# Patient Record
Sex: MALE | Race: WHITE | NOT HISPANIC OR LATINO | Employment: FULL TIME | URBAN - METROPOLITAN AREA
[De-identification: names, ages, dates, MRNs, and addresses within clinical notes are randomized per-mention and may not be internally consistent; named-entity substitution may affect disease eponyms.]

---

## 2023-03-03 LAB — PROSTATE SPECIFIC AG (NG/ML) IN SER/PLAS: 2.77 NG/ML (ref 0–4)

## 2023-08-28 ENCOUNTER — OFFICE VISIT (OUTPATIENT)
Dept: PRIMARY CARE | Facility: CLINIC | Age: 63
End: 2023-08-28
Payer: COMMERCIAL

## 2023-08-28 VITALS
SYSTOLIC BLOOD PRESSURE: 147 MMHG | HEART RATE: 104 BPM | WEIGHT: 243 LBS | HEIGHT: 68 IN | OXYGEN SATURATION: 96 % | BODY MASS INDEX: 36.83 KG/M2 | DIASTOLIC BLOOD PRESSURE: 86 MMHG

## 2023-08-28 DIAGNOSIS — Z13.6 SCREENING FOR HEART DISEASE: ICD-10-CM

## 2023-08-28 DIAGNOSIS — Z00.00 HEALTH CARE MAINTENANCE: Primary | ICD-10-CM

## 2023-08-28 DIAGNOSIS — L30.9 ACUTE DERMATITIS: ICD-10-CM

## 2023-08-28 DIAGNOSIS — E78.00 HYPERCHOLESTEROLEMIA: ICD-10-CM

## 2023-08-28 DIAGNOSIS — Z23 NEED FOR TETANUS BOOSTER: ICD-10-CM

## 2023-08-28 DIAGNOSIS — I10 PRIMARY HYPERTENSION: ICD-10-CM

## 2023-08-28 DIAGNOSIS — G47.30 SLEEP APNEA, UNSPECIFIED TYPE: ICD-10-CM

## 2023-08-28 PROBLEM — E66.9 OBESITY (BMI 35.0-39.9 WITHOUT COMORBIDITY): Status: ACTIVE | Noted: 2023-08-28

## 2023-08-28 PROBLEM — M79.89 SWELLING OF BOTH LOWER EXTREMITIES: Status: ACTIVE | Noted: 2023-08-28

## 2023-08-28 PROCEDURE — 90715 TDAP VACCINE 7 YRS/> IM: CPT | Performed by: INTERNAL MEDICINE

## 2023-08-28 PROCEDURE — 3077F SYST BP >= 140 MM HG: CPT | Performed by: INTERNAL MEDICINE

## 2023-08-28 PROCEDURE — 93000 ELECTROCARDIOGRAM COMPLETE: CPT | Performed by: INTERNAL MEDICINE

## 2023-08-28 PROCEDURE — 99396 PREV VISIT EST AGE 40-64: CPT | Performed by: INTERNAL MEDICINE

## 2023-08-28 PROCEDURE — 3079F DIAST BP 80-89 MM HG: CPT | Performed by: INTERNAL MEDICINE

## 2023-08-28 PROCEDURE — 90471 IMMUNIZATION ADMIN: CPT | Performed by: INTERNAL MEDICINE

## 2023-08-28 PROCEDURE — 1036F TOBACCO NON-USER: CPT | Performed by: INTERNAL MEDICINE

## 2023-08-28 RX ORDER — ATORVASTATIN CALCIUM 10 MG/1
1 TABLET, FILM COATED ORAL DAILY
COMMUNITY
Start: 2014-12-21 | End: 2023-08-28 | Stop reason: SDUPTHER

## 2023-08-28 RX ORDER — LISINOPRIL 20 MG/1
20 TABLET ORAL DAILY
Qty: 90 TABLET | Refills: 3 | Status: SHIPPED | OUTPATIENT
Start: 2023-08-28

## 2023-08-28 RX ORDER — ATORVASTATIN CALCIUM 10 MG/1
10 TABLET, FILM COATED ORAL DAILY
Qty: 90 TABLET | Refills: 3 | Status: SHIPPED | OUTPATIENT
Start: 2023-08-28

## 2023-08-28 RX ORDER — TRIAMCINOLONE ACETONIDE 1 MG/G
OINTMENT TOPICAL 2 TIMES DAILY PRN
Qty: 80 G | Refills: 0 | Status: SHIPPED | OUTPATIENT
Start: 2023-08-28 | End: 2023-12-26

## 2023-08-28 RX ORDER — LISINOPRIL 20 MG/1
1 TABLET ORAL DAILY
COMMUNITY
Start: 2015-08-28 | End: 2023-08-28 | Stop reason: SDUPTHER

## 2023-08-28 ASSESSMENT — PROMIS GLOBAL HEALTH SCALE
RATE_AVERAGE_PAIN: 1
CARRYOUT_SOCIAL_ACTIVITIES: GOOD
CARRYOUT_PHYSICAL_ACTIVITIES: COMPLETELY
RATE_GENERAL_HEALTH: GOOD
RATE_SOCIAL_SATISFACTION: EXCELLENT
RATE_PHYSICAL_HEALTH: GOOD
RATE_QUALITY_OF_LIFE: VERY GOOD
RATE_MENTAL_HEALTH: EXCELLENT
EMOTIONAL_PROBLEMS: NEVER
RATE_AVERAGE_FATIGUE: MILD

## 2023-08-28 ASSESSMENT — PATIENT HEALTH QUESTIONNAIRE - PHQ9
1. LITTLE INTEREST OR PLEASURE IN DOING THINGS: NOT AT ALL
SUM OF ALL RESPONSES TO PHQ9 QUESTIONS 1 AND 2: 0
2. FEELING DOWN, DEPRESSED OR HOPELESS: NOT AT ALL

## 2023-08-28 NOTE — ASSESSMENT & PLAN NOTE
Unclear etiology.  Likely dependent edema.  Continue with compression stockings.  Check EKG and echocardiogram.  Check renal function.

## 2023-08-28 NOTE — PROGRESS NOTES
Subjective   Patient ID: Ramin Masters is a 62 y.o. male who presents for Annual Exam.        HPI     Patient is a 62-year-old male with past medical history of hypertension dyslipidemia and obstructive sleep apnea who presents for wellness exam.  Last visit he was noted to have a significantly elevated prostate level.  He had an evaluation by urology and it was deemed due to prostatitis.  His PSA had returned back to baseline.  He denies any urologic complaints such as dribbling or burning on urination.    He does have hypertension for which he is supposed to be taking lisinopril 20.  He has not asked for refill of it since completing his last course.  He has been off the medication for several months.  His blood pressure is not well controlled at 146 systolic.  Denies headache changes in vision orthopnea.    Patient with dyslipidemia supposed to be taking Lipitor 10 however he has not been taking it.  He denies headache changes in vision orthopnea.    He does report ongoing issues related to lower extremity swelling.  It is bilateral without any calf pain.  Is been going on for over a year.  No shortness of breath on exertion    Patient is up-to-date on his colonoscopy.  He is interested in a repeat calcium score.  His last calcium score was in 2016 which was 0.  He denies any shortness of breath on exertion or chest pain.     Denies illicit substances or smoking    He reports that his father was diagnosed with an abdominal aortic aneurysm without history of smoking.  He is concerned.    Patient is  and has more than 18 grandkids.  He works as an orthotist.    Review of Systems  Constitutional: No fever or chills, No Night Sweats  Eyes: No Blurry Vision or Eye sight problems  ENT: No Nasal Discharge, Hoarseness, sore throat  Cardiovascular: no chest pain, no palpitations and no syncope.   Respiratory: no cough, no shortness of breath during exertion and no shortness of breath at rest.  "  Gastrointestinal: no abdominal pain, no nausea and no vomiting.   : No issues with urinary stream, burning with urination, no blood in urine or stools  Skin: No Skin rashes or Lesions  Neuro: No Headache, no dizziness or Numbness or tingling  Psych: No Anxiety, depression or sleeping problems  Heme: No Easy bleeding or brusing.     Objective   /86   Pulse 104   Ht 1.727 m (5' 8\")   Wt 110 kg (243 lb)   SpO2 96%   BMI 36.95 kg/m²     Physical Exam  Constitutional: Alert and in no acute distress. Well developed, well nourished.   Head and Face: Head and face: Normal.    Eyes: Normal external exam. Pupils were equal in size, round, reactive to light (PERRL) with normal accommodation and extraocular movements intact (EOMI).   Ears, Nose, Mouth, and Throat: External inspection of ears and nose: Normal.  Hearing: Normal.  Nasal mucosa, septum, and turbinates: Normal.  Lips, teeth, and gums: Normal.  Oropharynx: Normal.   Neck: No neck mass was observed. Supple. Thyroid not enlarged and there were no palpable thyroid nodules.   Cardiovascular: Heart rate and rhythm were normal, normal S1 and S2. Pedal pulses: Normal. +1 peripheral edema  Pulmonary: No respiratory distress. Clear bilateral breath sounds.   Abdomen: Soft nontender; no abdominal mass palpated. Normal bowel sounds. No organomegaly.   : Deferred  Musculoskeletal: No joint swelling seen, normal movements of all extremities. Range of motion: Normal.  Muscle strength/tone: Normal.    Skin: Normal skin color and pigmentation, normal skin turgor, and no rash.   Neurologic: Deep tendon reflexes were 2+ and symmetric.   Psychiatric: Judgment and insight: Intact. Mood and affect: Normal.  Lymphatic: No cervical lymphadenopathy. Palpation of lymph nodes in axillae: Normal.  Palpation of lymph nodes in groin: Normal.    Lab Results   Component Value Date    WBC 8.5 04/12/2021    HGB 14.6 04/12/2021    HCT 46.0 04/12/2021     04/12/2021    CHOL 185 " 04/12/2021    TRIG 172 (H) 04/12/2021    HDL 26.9 (A) 04/12/2021     04/12/2021    K 4.6 04/12/2021     04/12/2021    CREATININE 1.18 04/12/2021    BUN 18 04/12/2021    CO2 28 04/12/2021    PSA 2.77 03/03/2023       COLONOSCOPY  Ordered by an unspecified provider.      Assessment/Plan   Problem List Items Addressed This Visit          Cardiac and Vasculature    Hypercholesterolemia     Restart statin.  Check LDL fasting in 1 to 2 months         Relevant Medications    lisinopril 20 mg tablet    atorvastatin (Lipitor) 10 mg tablet    Hypertension    Relevant Medications    lisinopril 20 mg tablet    atorvastatin (Lipitor) 10 mg tablet    Other Relevant Orders    Transthoracic Echo (TTE) Complete    ECG 12 lead (Clinic Performed)       Health Encounters    Health care maintenance - Primary    Relevant Medications    lisinopril 20 mg tablet    atorvastatin (Lipitor) 10 mg tablet    Other Relevant Orders    CBC    Comprehensive metabolic panel    Lipid Panel    Prostate Spec.Ag,Screen       Sleep    Sleep apnea     Continue with CPAP nightly.         Relevant Medications    lisinopril 20 mg tablet    atorvastatin (Lipitor) 10 mg tablet     Other Visit Diagnoses       Need for tetanus booster        Relevant Medications    lisinopril 20 mg tablet    atorvastatin (Lipitor) 10 mg tablet    Other Relevant Orders    Tdap vaccine, age 10 years and older (BOOSTRIX)    Screening for heart disease        Relevant Medications    lisinopril 20 mg tablet    atorvastatin (Lipitor) 10 mg tablet    Other Relevant Orders    CT cardiac scoring wo IV contrast              Dear Ramin Masters     It was my pleasure to take care of you today in the office. Below are the things we discussed today:    1. Immunizations: Yearly Flu shot is recommended.          a: COVID: Up-to-date         b: Tetanus: Up-to-date         c: Shingrix: Up-to-date    2. Blood Work: Ordered  3. Seen your dentist twice a year  4. Yearly Eye exam is  recommended    5. BMI: 36.7  6: Diet recommendations:   Eat Clean, Try to have as many home cooked meals as possible  Avoid processed foods which contain excess calories, sugar, and sodium.    7. Exercise recommendations:   150 minutes a week to maintain your weight     If you have to loose weight, you need a better diet and exercise plan.     8. Please get your Living will / Advance directive completed if you do not have one already. Please make sure our office has a copy of the latest one.     9. Colonoscopy: Up-to-date  10. PSA: Ordered    11.     Follow up in one year for a Physical. Please call the office before your Physical to see if you need blood work completed prior to your physical.     Please call me if any questions arise from now until your next visit. I will call you after I am done seeing patients. A Doctor is always available by phone when the office is closed. Please feel free to call for help with any problem that you feel shouldn't wait until the office re-opens.     Roaslio Aranda, DO

## 2023-08-29 ENCOUNTER — APPOINTMENT (OUTPATIENT)
Dept: PRIMARY CARE | Facility: CLINIC | Age: 63
End: 2023-08-29
Payer: COMMERCIAL

## 2023-12-04 ENCOUNTER — LAB (OUTPATIENT)
Dept: LAB | Facility: LAB | Age: 63
End: 2023-12-04
Payer: COMMERCIAL

## 2023-12-04 ENCOUNTER — HOSPITAL ENCOUNTER (OUTPATIENT)
Dept: CARDIOLOGY | Facility: CLINIC | Age: 63
Discharge: HOME | End: 2023-12-04
Payer: COMMERCIAL

## 2023-12-04 DIAGNOSIS — I10 PRIMARY HYPERTENSION: ICD-10-CM

## 2023-12-04 DIAGNOSIS — Z00.00 HEALTH CARE MAINTENANCE: ICD-10-CM

## 2023-12-04 LAB
ALBUMIN SERPL BCP-MCNC: 4.5 G/DL (ref 3.4–5)
ALP SERPL-CCNC: 61 U/L (ref 33–136)
ALT SERPL W P-5'-P-CCNC: 22 U/L (ref 10–52)
ANION GAP SERPL CALC-SCNC: 14 MMOL/L (ref 10–20)
AORTIC VALVE PEAK VELOCITY: 1.26
AST SERPL W P-5'-P-CCNC: 13 U/L (ref 9–39)
AV PEAK GRADIENT: 6.4
AVA (PEAK VEL): 2.44
BILIRUB SERPL-MCNC: 0.7 MG/DL (ref 0–1.2)
BUN SERPL-MCNC: 16 MG/DL (ref 6–23)
CALCIUM SERPL-MCNC: 9.3 MG/DL (ref 8.6–10.6)
CHLORIDE SERPL-SCNC: 107 MMOL/L (ref 98–107)
CHOLEST SERPL-MCNC: 155 MG/DL (ref 0–199)
CHOLESTEROL/HDL RATIO: 3.5
CO2 SERPL-SCNC: 26 MMOL/L (ref 21–32)
CREAT SERPL-MCNC: 1.18 MG/DL (ref 0.5–1.3)
EJECTION FRACTION APICAL 4 CHAMBER: 52.8
EJECTION FRACTION: 60
ERYTHROCYTE [DISTWIDTH] IN BLOOD BY AUTOMATED COUNT: 13.7 % (ref 11.5–14.5)
GFR SERPL CREATININE-BSD FRML MDRD: 69 ML/MIN/1.73M*2
GLUCOSE SERPL-MCNC: 109 MG/DL (ref 74–99)
HCT VFR BLD AUTO: 46 % (ref 41–52)
HDLC SERPL-MCNC: 44.2 MG/DL
HGB BLD-MCNC: 14.8 G/DL (ref 13.5–17.5)
LDLC SERPL CALC-MCNC: 84 MG/DL
LEFT ATRIUM VOLUME AREA LENGTH INDEX BSA: 26.5
LEFT VENTRICLE INTERNAL DIMENSION DIASTOLE: 4.35 (ref 3.5–6)
LEFT VENTRICULAR OUTFLOW TRACT DIAMETER: 2
MCH RBC QN AUTO: 29.7 PG (ref 26–34)
MCHC RBC AUTO-ENTMCNC: 32.2 G/DL (ref 32–36)
MCV RBC AUTO: 92 FL (ref 80–100)
MITRAL VALVE E/A RATIO: 0.96
MITRAL VALVE E/E' RATIO: 12.59
NON HDL CHOLESTEROL: 111 MG/DL (ref 0–149)
NRBC BLD-RTO: 0 /100 WBCS (ref 0–0)
PLATELET # BLD AUTO: 292 X10*3/UL (ref 150–450)
POTASSIUM SERPL-SCNC: 4.8 MMOL/L (ref 3.5–5.3)
PROT SERPL-MCNC: 6.9 G/DL (ref 6.4–8.2)
PSA SERPL-MCNC: 2.75 NG/ML
RBC # BLD AUTO: 4.99 X10*6/UL (ref 4.5–5.9)
RIGHT VENTRICLE FREE WALL PEAK S': 8
SODIUM SERPL-SCNC: 142 MMOL/L (ref 136–145)
TRIGL SERPL-MCNC: 132 MG/DL (ref 0–149)
VLDL: 26 MG/DL (ref 0–40)
WBC # BLD AUTO: 7.1 X10*3/UL (ref 4.4–11.3)

## 2023-12-04 PROCEDURE — 85027 COMPLETE CBC AUTOMATED: CPT

## 2023-12-04 PROCEDURE — 84153 ASSAY OF PSA TOTAL: CPT

## 2023-12-04 PROCEDURE — 80061 LIPID PANEL: CPT

## 2023-12-04 PROCEDURE — 93306 TTE W/DOPPLER COMPLETE: CPT

## 2023-12-04 PROCEDURE — 36415 COLL VENOUS BLD VENIPUNCTURE: CPT

## 2023-12-04 PROCEDURE — 93306 TTE W/DOPPLER COMPLETE: CPT | Performed by: INTERNAL MEDICINE

## 2023-12-04 PROCEDURE — 80053 COMPREHEN METABOLIC PANEL: CPT

## 2023-12-04 NOTE — RESULT ENCOUNTER NOTE
The echocardiogram for the most part looked okay but there is some chronic changes related to the high blood pressure.  It is best to be more aggressive when it comes to the blood pressure.  Please make sure you are checking your blood pressure on a regular basis and return to clinic if your systolic blood pressure is consistently greater than 130 systolic

## 2024-01-11 ENCOUNTER — ANCILLARY PROCEDURE (OUTPATIENT)
Dept: RADIOLOGY | Facility: CLINIC | Age: 64
End: 2024-01-11
Payer: COMMERCIAL

## 2024-01-11 DIAGNOSIS — Z13.6 SCREENING FOR HEART DISEASE: ICD-10-CM

## 2024-01-11 PROCEDURE — 75571 CT HRT W/O DYE W/CA TEST: CPT

## 2024-01-28 ENCOUNTER — APPOINTMENT (OUTPATIENT)
Dept: RADIOLOGY | Facility: HOSPITAL | Age: 64
End: 2024-01-28
Payer: COMMERCIAL

## 2024-01-28 ENCOUNTER — HOSPITAL ENCOUNTER (EMERGENCY)
Facility: HOSPITAL | Age: 64
Discharge: HOME | End: 2024-01-28
Payer: COMMERCIAL

## 2024-01-28 VITALS
OXYGEN SATURATION: 98 % | SYSTOLIC BLOOD PRESSURE: 135 MMHG | RESPIRATION RATE: 18 BRPM | BODY MASS INDEX: 37.67 KG/M2 | HEIGHT: 67 IN | HEART RATE: 95 BPM | WEIGHT: 240 LBS | TEMPERATURE: 97.4 F | DIASTOLIC BLOOD PRESSURE: 84 MMHG

## 2024-01-28 DIAGNOSIS — N45.3 ACUTE EPIDIDYMO-ORCHITIS: Primary | ICD-10-CM

## 2024-01-28 LAB
ALBUMIN SERPL BCP-MCNC: 4.5 G/DL (ref 3.4–5)
ALP SERPL-CCNC: 57 U/L (ref 33–136)
ALT SERPL W P-5'-P-CCNC: 19 U/L (ref 10–52)
ANION GAP SERPL CALC-SCNC: 13 MMOL/L (ref 10–20)
APPEARANCE UR: CLEAR
AST SERPL W P-5'-P-CCNC: 11 U/L (ref 9–39)
BASOPHILS # BLD AUTO: 0.04 X10*3/UL (ref 0–0.1)
BASOPHILS NFR BLD AUTO: 0.2 %
BILIRUB SERPL-MCNC: 1 MG/DL (ref 0–1.2)
BILIRUB UR STRIP.AUTO-MCNC: NEGATIVE MG/DL
BUN SERPL-MCNC: 15 MG/DL (ref 6–23)
CALCIUM SERPL-MCNC: 9.7 MG/DL (ref 8.6–10.6)
CHLORIDE SERPL-SCNC: 104 MMOL/L (ref 98–107)
CO2 SERPL-SCNC: 27 MMOL/L (ref 21–32)
COLOR UR: YELLOW
CREAT SERPL-MCNC: 1.08 MG/DL (ref 0.5–1.3)
EGFRCR SERPLBLD CKD-EPI 2021: 77 ML/MIN/1.73M*2
EOSINOPHIL # BLD AUTO: 0.11 X10*3/UL (ref 0–0.7)
EOSINOPHIL NFR BLD AUTO: 0.6 %
ERYTHROCYTE [DISTWIDTH] IN BLOOD BY AUTOMATED COUNT: 13.5 % (ref 11.5–14.5)
GLUCOSE SERPL-MCNC: 97 MG/DL (ref 74–99)
GLUCOSE UR STRIP.AUTO-MCNC: NEGATIVE MG/DL
HCT VFR BLD AUTO: 46 % (ref 41–52)
HGB BLD-MCNC: 15.8 G/DL (ref 13.5–17.5)
IMM GRANULOCYTES # BLD AUTO: 0.07 X10*3/UL (ref 0–0.7)
IMM GRANULOCYTES NFR BLD AUTO: 0.4 % (ref 0–0.9)
KETONES UR STRIP.AUTO-MCNC: NEGATIVE MG/DL
LACTATE SERPL-SCNC: 1.1 MMOL/L (ref 0.4–2)
LEUKOCYTE ESTERASE UR QL STRIP.AUTO: NEGATIVE
LYMPHOCYTES # BLD AUTO: 2.81 X10*3/UL (ref 1.2–4.8)
LYMPHOCYTES NFR BLD AUTO: 15.8 %
MCH RBC QN AUTO: 30.4 PG (ref 26–34)
MCHC RBC AUTO-ENTMCNC: 34.3 G/DL (ref 32–36)
MCV RBC AUTO: 89 FL (ref 80–100)
MONOCYTES # BLD AUTO: 1.32 X10*3/UL (ref 0.1–1)
MONOCYTES NFR BLD AUTO: 7.4 %
NEUTROPHILS # BLD AUTO: 13.47 X10*3/UL (ref 1.2–7.7)
NEUTROPHILS NFR BLD AUTO: 75.6 %
NITRITE UR QL STRIP.AUTO: NEGATIVE
NRBC BLD-RTO: 0 /100 WBCS (ref 0–0)
PH UR STRIP.AUTO: 5 [PH]
PLATELET # BLD AUTO: 278 X10*3/UL (ref 150–450)
POTASSIUM SERPL-SCNC: 4.1 MMOL/L (ref 3.5–5.3)
PROT SERPL-MCNC: 8 G/DL (ref 6.4–8.2)
PROT UR STRIP.AUTO-MCNC: ABNORMAL MG/DL
RBC # BLD AUTO: 5.19 X10*6/UL (ref 4.5–5.9)
RBC # UR STRIP.AUTO: ABNORMAL /UL
RBC #/AREA URNS AUTO: >20 /HPF
SODIUM SERPL-SCNC: 140 MMOL/L (ref 136–145)
SP GR UR STRIP.AUTO: 1.03
UROBILINOGEN UR STRIP.AUTO-MCNC: <2 MG/DL
WBC # BLD AUTO: 17.8 X10*3/UL (ref 4.4–11.3)
WBC #/AREA URNS AUTO: ABNORMAL /HPF

## 2024-01-28 PROCEDURE — 80053 COMPREHEN METABOLIC PANEL: CPT | Performed by: NURSE PRACTITIONER

## 2024-01-28 PROCEDURE — 93975 VASCULAR STUDY: CPT

## 2024-01-28 PROCEDURE — 36415 COLL VENOUS BLD VENIPUNCTURE: CPT | Performed by: NURSE PRACTITIONER

## 2024-01-28 PROCEDURE — 76870 US EXAM SCROTUM: CPT

## 2024-01-28 PROCEDURE — 81003 URINALYSIS AUTO W/O SCOPE: CPT | Performed by: NURSE PRACTITIONER

## 2024-01-28 PROCEDURE — 99283 EMERGENCY DEPT VISIT LOW MDM: CPT

## 2024-01-28 PROCEDURE — 76870 US EXAM SCROTUM: CPT | Performed by: STUDENT IN AN ORGANIZED HEALTH CARE EDUCATION/TRAINING PROGRAM

## 2024-01-28 PROCEDURE — 99284 EMERGENCY DEPT VISIT MOD MDM: CPT

## 2024-01-28 PROCEDURE — 85025 COMPLETE CBC W/AUTO DIFF WBC: CPT | Performed by: NURSE PRACTITIONER

## 2024-01-28 PROCEDURE — 99285 EMERGENCY DEPT VISIT HI MDM: CPT | Performed by: NURSE PRACTITIONER

## 2024-01-28 PROCEDURE — 83605 ASSAY OF LACTIC ACID: CPT | Performed by: NURSE PRACTITIONER

## 2024-01-28 RX ORDER — LEVOFLOXACIN 500 MG/1
500 TABLET, FILM COATED ORAL DAILY
Qty: 10 TABLET | Refills: 0 | Status: SHIPPED | OUTPATIENT
Start: 2024-01-28 | End: 2024-02-07

## 2024-01-28 ASSESSMENT — COLUMBIA-SUICIDE SEVERITY RATING SCALE - C-SSRS
2. HAVE YOU ACTUALLY HAD ANY THOUGHTS OF KILLING YOURSELF?: NO
1. IN THE PAST MONTH, HAVE YOU WISHED YOU WERE DEAD OR WISHED YOU COULD GO TO SLEEP AND NOT WAKE UP?: NO
6. HAVE YOU EVER DONE ANYTHING, STARTED TO DO ANYTHING, OR PREPARED TO DO ANYTHING TO END YOUR LIFE?: NO

## 2024-01-28 NOTE — ED PROVIDER NOTES
HPI   Chief Complaint   Patient presents with   • Groin Swelling       A very pleasant 63-year-old male with a history significant for hypertension, SALOME on CPAP, dyslipidemia, obesity, BPH, psoriasis, presents ambulatory to the emergency department after sudden onset of left testicular pain/swelling/redness midday yesterday. Endorses associated fever (101 to 101.8), fatigue, body aches, and malaise. Pain improves when lying down. He took Nyquil x 2 which reduced his fever. States that he was swimming in the ocean earlier this week, but denies any specific injury. He tested negative for covid at home. Denies current headache, visual changes, ear pain, sore throat, nasal congestion, chest pain, palpitations, syncope, cough, abdominal pain, alteration in appetite, nausea, vomiting, change in stool caliber or frequency, urinary burning, frequency, urgency, hematuria, or rash. No ill family contacts. Monogamous long term relationship.     Review of systems: A review of systems was performed with pertinent positives and negatives as per HPI    Social history: Non-smoker.  Rarely drinks alcohol.  Denies any drug use.    Family history: No ill family contacts.    Medical history: Hypertension, SALOME, dyslipidemia, obesity, BPH, psoriasis, seasonal allergies, pulmonary nodule (CT 1/24). Echo (12/23) Normal left ventricular systolic function. EF: 60-65 %    Surgical history: Dental extractions    Medications: Lisinopril, atorvastatin, NyQuil    Allergies: No known drug allergies      History provided by:  Patient and spouse   used: No                        [place] Coma Scale Score: 15                  Patient History   No past medical history on file.  No past surgical history on file.  No family history on file.  Social History     Tobacco Use   • Smoking status: Never     Passive exposure: Never   • Smokeless tobacco: Never   Substance Use Topics   • Alcohol use: Yes   • Drug use: Never       Physical  Exam   ED Triage Vitals [01/28/24 1619]   Temperature Heart Rate Respirations BP   36.3 °C (97.4 °F) 95 18 135/84      Pulse Ox Temp src Heart Rate Source Patient Position   98 % -- -- --      BP Location FiO2 (%)     -- --       Physical Exam  Exam conducted with a chaperone present.   Constitutional:       General: He is not in acute distress.     Appearance: Normal appearance. He is obese. He is not ill-appearing or toxic-appearing.   HENT:      Head: Normocephalic and atraumatic.      Right Ear: External ear normal.      Left Ear: External ear normal.      Nose: Nose normal. No congestion or rhinorrhea.      Mouth/Throat:      Mouth: Mucous membranes are moist.      Pharynx: Oropharynx is clear. No oropharyngeal exudate or posterior oropharyngeal erythema.   Eyes:      General: No scleral icterus.     Extraocular Movements: Extraocular movements intact.      Conjunctiva/sclera: Conjunctivae normal.      Pupils: Pupils are equal, round, and reactive to light.   Cardiovascular:      Rate and Rhythm: Normal rate and regular rhythm.      Pulses: Normal pulses.      Heart sounds: No murmur heard.  Pulmonary:      Effort: Pulmonary effort is normal. No respiratory distress.      Breath sounds: Normal breath sounds. No stridor. No wheezing or rhonchi.   Abdominal:      General: There is no distension.      Tenderness: There is no abdominal tenderness. There is no right CVA tenderness, left CVA tenderness, guarding or rebound.   Genitourinary:     Penis: Normal and circumcised.       Testes:         Left: Tenderness and swelling present.      Ruddy stage (genital): 5.      Comments: Left testicular erythema/warmth/swelling/tenderness  Musculoskeletal:         General: No swelling or tenderness. Normal range of motion.      Cervical back: Normal range of motion and neck supple. No spasms or tenderness.      Thoracic back: Normal.      Lumbar back: Normal.      Right lower leg: No edema.      Left lower leg: No edema.    Lymphadenopathy:      Cervical: No cervical adenopathy.   Skin:     General: Skin is warm and dry.      Capillary Refill: Capillary refill takes less than 2 seconds.   Neurological:      Mental Status: He is alert and oriented to person, place, and time.      Sensory: No sensory deficit.      Coordination: Coordination normal.      Gait: Gait normal.   Psychiatric:         Mood and Affect: Mood normal.         Behavior: Behavior normal.         ED Course & MDM   Diagnoses as of 01/28/24 1927   Acute epididymo-orchitis       Medical Decision Making  A physical examination and interview was conducted with a pleasant 63-year-old male with a history significant for hypertension, SALOME, dyslipidemia, obesity, BPH, psoriasis who presents to the emergency department after sudden onset of left testicular pain/swelling/erythema with associated fever, malaise, and fatigue yesterday mid afternoon.  Current temperature is 99.5.  He is otherwise hemodynamically stable. Denies any concern for STI. No penile discharge.  He does not appear toxic.  States he has pain of approximately 4 out of 10.  I discussed the plan of care with the patient and his wife.  Routine laboratory testing including a UA, CBC, CMP, and lactate will be obtained. I will also order a scrotal ultrasound.  He denies need for any pain medications at this time.  Instructed him to inform the nurse if he changes his mind.   Laboratory test results were reviewed: CBC (WBC 17.8) UA (+ pro, blood). Lactate and CMP are unremarkable.   Scrotal ultrasound asymmetrical enlargement and hypervascular left testicle and left epididymis consistent with left epididymo- orchitis given clinical picture.  I discussed test result and differential with the patient and his wife. He reports a history of prostatitis and evaluation by Dr. Sky ( Urology). He has remained hemodynamically stable throughout his ED stay.   Encouraged to drink plenty of fluids. Empty his bladder when he  feels the urge. Elevated his scrotum/lay down for symptom relief. I will prescribe levofloxacin x 10 days. He will follow up with Dr. Sky this week. Advised to return to the emergency room for worsening symptoms or other concerns.         Procedure  Procedures     FRANSICO Oscar-JUAN FRANCISCO  01/28/24 1927

## 2024-01-30 ENCOUNTER — OFFICE VISIT (OUTPATIENT)
Dept: UROLOGY | Facility: CLINIC | Age: 64
End: 2024-01-30
Payer: COMMERCIAL

## 2024-01-30 VITALS — BODY MASS INDEX: 36.37 KG/M2 | HEIGHT: 68 IN | TEMPERATURE: 96.2 F | WEIGHT: 240 LBS

## 2024-01-30 DIAGNOSIS — R31.9 HEMATURIA, UNSPECIFIED TYPE: ICD-10-CM

## 2024-01-30 DIAGNOSIS — N45.3 EPIDIDYMOORCHITIS: Primary | ICD-10-CM

## 2024-01-30 LAB
POC APPEARANCE, URINE: CLEAR
POC BILIRUBIN, URINE: NEGATIVE
POC BLOOD, URINE: ABNORMAL
POC COLOR, URINE: YELLOW
POC GLUCOSE, URINE: NEGATIVE MG/DL
POC KETONES, URINE: NEGATIVE MG/DL
POC LEUKOCYTES, URINE: NEGATIVE
POC NITRITE,URINE: NEGATIVE
POC PH, URINE: 5.5 PH
POC PROTEIN, URINE: NEGATIVE MG/DL
POC SPECIFIC GRAVITY, URINE: >=1.03
POC UROBILINOGEN, URINE: 0.2 EU/DL

## 2024-01-30 PROCEDURE — 81002 URINALYSIS NONAUTO W/O SCOPE: CPT | Performed by: UROLOGY

## 2024-01-30 PROCEDURE — 99203 OFFICE O/P NEW LOW 30 MIN: CPT | Performed by: UROLOGY

## 2024-01-30 PROCEDURE — 1036F TOBACCO NON-USER: CPT | Performed by: UROLOGY

## 2024-01-30 ASSESSMENT — PAIN SCALES - GENERAL: PAINLEVEL: 5

## 2024-01-30 NOTE — PROGRESS NOTES
Subjective   Ramin Masters is a 63 y.o. male presenting as a new patient due to left testicular pain. He was seen in the ER on 1/28/24 with complaints of left testicular swelling and pain associated with fever, fatigue and body aches. Scrotal US was obtained which showed asymmetrically enlarged and hypervascular left testicle and left epididymis most consistent with epididymo-orchitis. He was started on levofloxacin x 10 days. Today, he has significant discomfort, low grade fever and chills. Denies any recent gross hematuria, nausea or vomiting.      No past medical history on file.  No past surgical history on file.  No family history on file.  Current Outpatient Medications   Medication Sig Dispense Refill    atorvastatin (Lipitor) 10 mg tablet Take 1 tablet (10 mg) by mouth once daily. 90 tablet 3    levoFLOXacin (Levaquin) 500 mg tablet Take 1 tablet (500 mg) by mouth once daily for 10 days. 10 tablet 0    lisinopril 20 mg tablet Take 1 tablet (20 mg) by mouth once daily. 90 tablet 3     No current facility-administered medications for this visit.     No Known Allergies  Social History     Socioeconomic History    Marital status:      Spouse name: Not on file    Number of children: Not on file    Years of education: Not on file    Highest education level: Not on file   Occupational History    Not on file   Tobacco Use    Smoking status: Never     Passive exposure: Never    Smokeless tobacco: Never   Substance and Sexual Activity    Alcohol use: Yes    Drug use: Never    Sexual activity: Not on file   Other Topics Concern    Not on file   Social History Narrative    Not on file     Social Determinants of Health     Financial Resource Strain: Not on file   Food Insecurity: Not on file   Transportation Needs: Not on file   Physical Activity: Not on file   Stress: Not on file   Social Connections: Not on file   Intimate Partner Violence: Not on file   Housing Stability: Not on file       Review of  Systems  Pertinent items are noted in HPI.    Objective     Lab Review  Lab Results   Component Value Date    WBC 17.8 (H) 01/28/2024    RBC 5.19 01/28/2024    HGB 15.8 01/28/2024    HCT 46.0 01/28/2024     01/28/2024      Lab Results   Component Value Date    BUN 15 01/28/2024    CREATININE 1.08 01/28/2024      Lab Results   Component Value Date    PSA 2.77 03/03/2023    PSA 3.5 12/07/2022     PVR 2ml  Urine analysis shows +blood     Assessment/Plan   Diagnoses and all orders for this visit:  Epididymoorchitis  -     US scrotum w doppler; Future  Hematuria, unspecified type  -     Measure post void residual  -     POCT UA (nonautomated) manually resulted    Epididymoorchitis       I reviewed scrotal US from 1/28/24 which showed Asymmetrically enlarged and hypervascular left testicle and left epididymis most consistent with epididymo-orchitis    Plan to continue with course of levofloxacin. We discussed supportive treatment with scrotal elevation and NSAIDS.     We will repeat scrotal US next week and follow up virtually to review.     All questions were answered to the patient's satisfaction. Patient agrees with the plan and wishes to proceed. Follow-up will be scheduled appropriately.     Scribed for Dr. Butt by Claudia Rogel. I , Dr Butt, have personally reviewed and agreed with the information entered by the Virtual Scribe.

## 2024-02-05 ENCOUNTER — APPOINTMENT (OUTPATIENT)
Dept: RADIOLOGY | Facility: CLINIC | Age: 64
End: 2024-02-05
Payer: COMMERCIAL

## 2024-02-16 ENCOUNTER — APPOINTMENT (OUTPATIENT)
Dept: UROLOGY | Facility: CLINIC | Age: 64
End: 2024-02-16
Payer: COMMERCIAL

## 2024-02-19 ENCOUNTER — APPOINTMENT (OUTPATIENT)
Dept: UROLOGY | Facility: CLINIC | Age: 64
End: 2024-02-19
Payer: COMMERCIAL

## 2024-02-26 ENCOUNTER — LAB (OUTPATIENT)
Dept: LAB | Facility: LAB | Age: 64
End: 2024-02-26
Payer: COMMERCIAL

## 2024-02-26 ENCOUNTER — APPOINTMENT (OUTPATIENT)
Dept: SLEEP MEDICINE | Facility: CLINIC | Age: 64
End: 2024-02-26
Payer: COMMERCIAL

## 2024-02-26 DIAGNOSIS — R31.9 HEMATURIA, UNSPECIFIED TYPE: Primary | ICD-10-CM

## 2024-02-26 DIAGNOSIS — Z79.2 NEED FOR PROPHYLACTIC ANTIBIOTIC: ICD-10-CM

## 2024-02-26 DIAGNOSIS — R31.9 HEMATURIA, UNSPECIFIED TYPE: ICD-10-CM

## 2024-02-26 PROCEDURE — 87086 URINE CULTURE/COLONY COUNT: CPT

## 2024-02-27 LAB — BACTERIA UR CULT: NORMAL

## 2024-03-01 ENCOUNTER — TELEMEDICINE (OUTPATIENT)
Dept: UROLOGY | Facility: CLINIC | Age: 64
End: 2024-03-01
Payer: COMMERCIAL

## 2024-03-01 DIAGNOSIS — R31.0 GROSS HEMATURIA: Primary | ICD-10-CM

## 2024-03-01 PROCEDURE — 99214 OFFICE O/P EST MOD 30 MIN: CPT | Performed by: UROLOGY

## 2024-03-01 PROCEDURE — 1036F TOBACCO NON-USER: CPT | Performed by: UROLOGY

## 2024-03-01 RX ORDER — CEPHALEXIN 500 MG/1
500 CAPSULE ORAL ONCE
Qty: 1 CAPSULE | Refills: 0 | Status: SHIPPED | OUTPATIENT
Start: 2024-03-01 | End: 2024-03-01

## 2024-03-01 NOTE — PROGRESS NOTES
This visit was completed via telemedicine. All issues as below were discussed and addressed but no physical exam was performed unless allowed by visual confirmation. If it was felt that the patient should be evaluated in clinic, then they were directed there. Patient verbal consented to the visit. The patient’s EMR has been reviewed.    SUBJECTIVE: HPI   Patient is a 63 y.o., male with history of epididymoorchitis (L), s/p course of levofloxacin, however has developed acute episodes of gross hematuria. Microscopic UA positive for >20 RBC's, 6-10 WBC's. (01/28/24). Urine culture negative (02/26/24).    Presenting virtually today for a follow up visit.   Reports his testicular discomfort resolved s/p antibiotics.   However, acknowledges recent episodes of gross hematuria.   Associated with mild dysuria and passing a few clots.   Onset occurred shortly after the Superbowl and developing a GI illness.   Denies any flank pain, fevers or chills.     TO REVIEW: Initial visit (01/30/24)  Presenting as a new patient due to left testicular pain. He was seen in the ER on 1/28/24 with complaints of left testicular swelling and pain associated with fever, fatigue and body aches. Scrotal US was obtained which showed asymmetrically enlarged and hypervascular left testicle and left epididymis most consistent with epididymo-orchitis. He was started on levofloxacin x 10 days. Today, he has significant discomfort, low grade fever and chills. Denies any recent gross hematuria, nausea or vomiting.     I reviewed scrotal US from 1/28/24 which showed asymmetrically enlarged and hypervascular left testicle and left epididymis most consistent with epididymo-orchitis.    Past medical, surgical, family and social history in the chart was reviewed and is accurate including any additions to what is in this HPI.    Review of Systems   Constitutional: denies any unintentional weight loss or change in strength.  Integumentary: denies any rashes or  pruritus.  Eyes: denies any double vision or eye pain.  Ear/Nose/Mouth/Throat: denies any nosebleeds or gum bleeds.  Cardiovascular: denies any chest pain or syncope.  Respiratory: denies hemoptysis.  Gastrointestinal: denies nausea or vomiting.  Musculoskeletal: denies muscle cramping or weakness.  Neurologic: denies convulsions or seizures.  Hematologic/Lymphatic: denies bleeding tendencies.  Endocrine: denies heat/cold intolerance.  All other systems have been reviewed and are negative unless otherwise noted in the HPI.    OBJECTIVE:  Constitutional: NAD  HEENT: AT/NC  Resp: Non labored respirations.  Skin: No jaundice or visible skin lesions.  Neuro: No focal deficits.  Psych: Appropriate mood and affect.  EXAM LIMITED 2/2 TELEHEALTH VISIT.     Labs & Imaging:  Lab Results   Component Value Date    WBC 17.8 (H) 01/28/2024    HGB 15.8 01/28/2024    HCT 46.0 01/28/2024     01/28/2024    CHOL 155 12/04/2023    TRIG 132 12/04/2023    HDL 44.2 12/04/2023    ALT 19 01/28/2024    AST 11 01/28/2024     01/28/2024    K 4.1 01/28/2024     01/28/2024    CREATININE 1.08 01/28/2024    BUN 15 01/28/2024    CO2 27 01/28/2024    PSA 2.77 03/03/2023     ASSESSMENT:  Problem List Items Addressed This Visit    None  Visit Diagnoses       Gross hematuria    -  Primary    Relevant Orders    CT urography w 3D volume rendered imaging    Creatinine, Serum    Cystourethroscopy     PLAN:  1. Gross hematuria  To address the patient’s hematuria. I recommended the patient follow-up for hematuria to include cystoscopy, CT urogram, and urine cytology. Patient is aware of the risks associated with intravenous contrast. There is no history of renal dysfunction. Risks of cystoscopy were discussed including risk of hematuria, UTI and discomfort. Patient acknowledged they understood and desires to proceed.     2. Epididymoorchitis - resolved s/p course of levofloxacin  Encouraged continuing supportive treatment with scrotal  elevation and NSAIDs PRN.      All questions were answered to the patient’s satisfaction.  Patient agrees with the plan and wishes to proceed.  Follow-up will be scheduled appropriately.     Scribed for Dr. Olive Butt by Noah Willams.  I, Dr. Olive Butt have personally reviewed and agreed with the information entered by the Virtual Scribe. 03/01/24

## 2024-03-04 ENCOUNTER — LAB (OUTPATIENT)
Dept: LAB | Facility: LAB | Age: 64
End: 2024-03-04
Payer: COMMERCIAL

## 2024-03-04 DIAGNOSIS — R31.0 GROSS HEMATURIA: ICD-10-CM

## 2024-03-04 LAB
CREAT SERPL-MCNC: 1.08 MG/DL (ref 0.5–1.3)
EGFRCR SERPLBLD CKD-EPI 2021: 77 ML/MIN/1.73M*2

## 2024-03-04 PROCEDURE — 82565 ASSAY OF CREATININE: CPT

## 2024-03-04 PROCEDURE — 36415 COLL VENOUS BLD VENIPUNCTURE: CPT

## 2024-03-18 ENCOUNTER — HOSPITAL ENCOUNTER (OUTPATIENT)
Dept: RADIOLOGY | Facility: CLINIC | Age: 64
Discharge: HOME | End: 2024-03-18
Payer: COMMERCIAL

## 2024-03-18 DIAGNOSIS — R31.0 GROSS HEMATURIA: ICD-10-CM

## 2024-03-18 PROCEDURE — 74178 CT ABD&PLV WO CNTR FLWD CNTR: CPT | Performed by: STUDENT IN AN ORGANIZED HEALTH CARE EDUCATION/TRAINING PROGRAM

## 2024-03-18 PROCEDURE — 76377 3D RENDER W/INTRP POSTPROCES: CPT | Performed by: STUDENT IN AN ORGANIZED HEALTH CARE EDUCATION/TRAINING PROGRAM

## 2024-03-18 PROCEDURE — 2550000001 HC RX 255 CONTRASTS: Performed by: UROLOGY

## 2024-03-18 PROCEDURE — 76377 3D RENDER W/INTRP POSTPROCES: CPT

## 2024-03-18 RX ADMIN — IOHEXOL 75 ML: 350 INJECTION, SOLUTION INTRAVENOUS at 16:12

## 2024-03-25 ENCOUNTER — OFFICE VISIT (OUTPATIENT)
Dept: SLEEP MEDICINE | Facility: CLINIC | Age: 64
End: 2024-03-25
Payer: COMMERCIAL

## 2024-03-25 VITALS
DIASTOLIC BLOOD PRESSURE: 81 MMHG | HEART RATE: 75 BPM | WEIGHT: 248.8 LBS | BODY MASS INDEX: 38.39 KG/M2 | SYSTOLIC BLOOD PRESSURE: 134 MMHG | TEMPERATURE: 97.6 F

## 2024-03-25 DIAGNOSIS — G47.30 SLEEP APNEA, UNSPECIFIED TYPE: ICD-10-CM

## 2024-03-25 DIAGNOSIS — E66.9 OBESITY (BMI 35.0-39.9 WITHOUT COMORBIDITY): ICD-10-CM

## 2024-03-25 DIAGNOSIS — E66.9 CLASS 2 OBESITY WITH BODY MASS INDEX (BMI) OF 38.0 TO 38.9 IN ADULT, UNSPECIFIED OBESITY TYPE, UNSPECIFIED WHETHER SERIOUS COMORBIDITY PRESENT: ICD-10-CM

## 2024-03-25 DIAGNOSIS — I10 PRIMARY HYPERTENSION: Primary | ICD-10-CM

## 2024-03-25 PROCEDURE — 1036F TOBACCO NON-USER: CPT | Performed by: PHYSICIAN ASSISTANT

## 2024-03-25 PROCEDURE — 3079F DIAST BP 80-89 MM HG: CPT | Performed by: PHYSICIAN ASSISTANT

## 2024-03-25 PROCEDURE — 3008F BODY MASS INDEX DOCD: CPT | Performed by: PHYSICIAN ASSISTANT

## 2024-03-25 PROCEDURE — 3075F SYST BP GE 130 - 139MM HG: CPT | Performed by: PHYSICIAN ASSISTANT

## 2024-03-25 PROCEDURE — 99204 OFFICE O/P NEW MOD 45 MIN: CPT | Performed by: PHYSICIAN ASSISTANT

## 2024-03-25 NOTE — PATIENT INSTRUCTIONS
[facility] Sleep Medicine  DO [address]  [address] [address]       NAME: Ramin Masters   DATE: 03/25/24    Your Sleep Provider Today: Ximena Lopez PA-C  Your Primary Care Physician: Rosalio Aranda, DO   Your Referring Provider: No ref. provider found    DIAGNOSIS:   1. Sleep apnea, unspecified type  Home sleep apnea test (HSAT)    Positive Airway Pressure (PAP) Therapy          Thank you for coming to the Sleep Medicine Clinic today! Your sleep medicine provider today was: Ximena Lopez PA-C Below is a summary of your treatment plan, other important information, and our contact numbers:      TREATMENT PLAN         Instructions - Common SALOME Recs: - For your sleep apnea, continue to use your PAP every night and use it whenever you are sleeping.   - Avoid alcohol or sedatives several hours prior to sleeping.   - Get additional supplies for your PAP (e.g., mask, hose, filters) every 3 months or as your insurance allows from your [employer] company. Replacement cushions for your PAP mask can be requested monthly if airseals are an issue.  - Remember to clean your mask, tubings, and water chamber regularly as instructed.  - Avoid driving or operating heavy machinery when drowsy. A person driving while sleepy is five (5) times more likely to have an accident. If you feel sleepy, pull over and take a short power nap (sleep for less than 30 minutes). Otherwise, ask somebody to drive you.        Follow-up Appointment:   Will determine after sleep study       IMPORTANT INFORMATION     Call 911 for medical emergencies.  Our offices are generally open from Monday-Friday, 9 am - 5 pm.  If you need to get in touch with me, you may either call me and my team(number is below) or you can use [employer].  If a referral for a test, for CPAP, or for another specialist was made, and you have not heard about scheduling this within a week, please call scheduling at [phone]  ([phone]).  If you are unable to make your appointment for clinic or an overnight study, kindly call the office at least 48 hours in advance to cancel and reschedule.  If you are on CPAP, please bring your device's card or the device to each clinic appointment.   There are no supporting services by either the sleep doctors or their staff on weekends and Holidays, or after 5 PM on weekdays.   If you have been asked to come to a sleep study, make sure you bring toiletries, a comfy pillow, and any nighttime medications that you may regularly take. Also be sure to eat dinner before you arrive. We generally do not provide meals.      PRESCRIPTIONS     We require 7 days advanced notice for prescription refills. If we do not receive the request in this time, we cannot guarantee that your medication will be refilled in time.      IMPORTANT PHONE NUMBERS     Sleep Medicine Clinic Fax: [phone]  Appointments (for Adult Sleep Clinic): [phone] - option 2  Appointments (For Sleep Studies): [phone] - option 3  Behavioral Sleep Medicine: [phone]  Sleep Surgery: [phone]  ENT (Otolaryngology): [phone]  Headache Clinic (Neurology): [phone]  Neurology: [phone]  Psychiatry: [phone]  Pulmonary Function Testing (PFT) Center: [phone]  Pulmonary Medicine: [phone]  [employer] (DME): [phone]  [facility] (DME): [phone]  [facility] (DME): [phone]      OUR ADULT SLEEP MEDICINE TEAM   Please do not hesitate to call the office or sleep nurse with any questions between appointments:    Adult Sleep Nurses (Nahomi Gambino, RN and Kristie Walden RN):  For clinical questions and refilling prescriptions: [phone]  Email sleep diaries and other documents at: [email or web address]    Adult Sleep Medicine Secretaries:  Brittanie Hawk (For Rich/Hernandez/Jessica/Roxie/Sung/Ayush):   P: [phone]  F: [phone]  Elinor  Jadyn (For Morales/Guggencarlyler): P: [phone]  Fax: [phone]  Aimee Lojaparas (For Truevic/Blank): P: [phone]  F: [phone]  Holly Ji (For Farooq): P: [phone]  F: [phone]  Kimberlyn Gonzalez (For Carla/Lacho/Zakhary): P: [phone]  F: [phone]  Darling Prince (For Huertas/[place]en): P: [phone]  F: [phone]     Adult Sleep Medicine Advanced Practice Providers:  Boyd Lam ([place], [place])  Miya Monk ([facility])  Sara Rosario CNP ([place], [place], [place])  Racquel Lopez CNP ([place], [place], [place])  Radha Trivedi ([place], [place], [place])  Hui Mcfadden CNP ([facility])        OUR SLEEP TESTING LOCATIONS     Our team will contact you to schedule your sleep study, however, you can contact us as follow:  Main Phone Line (scheduling only): [phone]), option 3  Adult and Pediatric Locations  [facility] (6 years and older): [facility] - 4th floor ([address] [place] [address]) After hours line: [phone]  [facility] (Main campus: All ages): [facility], 6th floor. After hours line: [phone]   [place] (5 years and older; younger considered on case-by-case basis): [address]; [address], [address]. Scheduling  After hours line: [phone]   [place] (6 years and older): [address]; [address]; [address]   [place] (6 years and older): [address] [facility] [address] [facility], [address]  After hours line: [phone]   [place] (13 years and older) in [place]: [address], 2nd floor  After hours line: [phone]   [place] (13 year and older): [address]  After hours line: [phone]     Adult Only Locations:   [place] (18 years and older): [address] [facility], 2nd floor   [place] (18 years and older): [address] [facility]; 4th floor  After hours line: [phone]  [facility] (18 years and older) at  "[place]: [address] [place] [address]  After hours line: [phone]          CONTACTING YOUR SLEEP MEDICINE PROVIDER     Send a message directly to your provider through \"My Chart\", which is the email service through your  Records Account: [email or web address]   Call [phone] and leave a message. One of the administrative assistants will forward the message to your sleep medicine provider through \"My Chart\" and/or email.     Your sleep medicine provider for this visit was: Ximena Lopez PA-C   "

## 2024-03-25 NOTE — ASSESSMENT & PLAN NOTE
-discussed relationship to SALOME  -encourage healthy diet/exercise/weight loss  -going to talk to PCP about medication options  -declines nutrition referral today

## 2024-03-25 NOTE — PROGRESS NOTES
Patient: Ramin Masters    [id number]  : 1960 -- AGE 63 y.o.    Provider: Ximena Lopez PA-C     Location [facility]   Service Date: 3/25/2024              [facility] Sleep Medicine Clinic  New Visit Note      HISTORY OF PRESENT ILLNESS     The patient's referring provider is: No ref. provider found; Rosalio Aranda DO    HISTORY OF PRESENT ILLNESS   Ramin Masters is a 63 y.o. male with h/o SALOME, obesity, HTN who presents to a [facility] Sleep Medicine Clinic for a sleep medicine evaluation with concerns of New Patient Visit.     Past Sleep History  Diagnosed with severe SALOME in 2014  Titration study recommended cpap 11cwp      Current History    On today's visit, the patient reports here to establish sleep medicine provider  -Diagnosed with sleep apnea approximately 10 years ago, noted to have severe sleep apnea  -original device, is ~10 years old and has been seeing motor life warning for some time now  -wants to proceed with repap   -Since starting CPAP, he is not snoring  -no leg kicking either with CPAP on, reports this was an issue  -[employer] FFM  -not currently es is a mask that he states is comfortable tablished with DME, purchasing supplies out of pocket  -Wants to get a new CPAP as mentioned, however also inquiring about a potential oral appliance, to use part-time he is not on a prescription at this time but is going to think it over and let me know if he wants to proceed      Sleep schedule below.  No current restless legs, parasomnias, insomnia or other sleep concerns.    Sleep schedule:  In bed: 11:30P  Subjective sleep latency:  15 min   Awakenings during night:  1-2  - nocturia   Length of awakenings:  few minutes  Final awakening time:  7AM  Naps: none     Overall estimate of total sleep time: 6-7 hours  Weekends/Days off: sleeps 10:30P-7A    Preferred sleeping position: SLEEP POSITION: sidelying      ESS:  9  BEATRIZ:  8  FOSQ: 38      REVIEW OF  SYSTEMS     REVIEW OF SYSTEMS  See HPI; all other ROS were reviewed and negative for compliant        ALLERGIES AND MEDICATIONS     ALLERGIES  No Known Allergies    MEDICATIONS  Current Outpatient Medications   Medication Sig Dispense Refill    atorvastatin (Lipitor) 10 mg tablet Take 1 tablet (10 mg) by mouth once daily. 90 tablet 3    lisinopril 20 mg tablet Take 1 tablet (20 mg) by mouth once daily. 90 tablet 3     No current facility-administered medications for this visit.         PAST HISTORY     PAST MEDICAL HISTORY  He  has no past medical history on file.    PAST SURGICAL HISTORY:  No past surgical history on file.    FAMILY HISTORY  No family history on file.    He does have a family history of sleep disorder. Brother has sleep apnea    SOCIAL HISTORY  He  reports that he has never smoked. He has never been exposed to tobacco smoke. He has never used smokeless tobacco. He reports current alcohol use. He reports that he does not use drugs. He    Caffeine consumption: Yes, 1 cups/day  Alcohol consumption: Yes, rarely (occasional)  Marijuana: No      PHYSICAL EXAM     VITAL SIGNS: There were no vitals taken for this visit.     CURRENT WEIGHT: There were no vitals filed for this visit.  There is no height or weight on file to calculate BMI.  PREVIOUS WEIGHTS:  Wt Readings from Last 3 Encounters:   01/30/24 109 kg (240 lb)   01/28/24 109 kg (240 lb)   08/28/23 110 kg (243 lb)       Constitutional: Alert and oriented, cooperative, no acute distress  Head: Normocephalic, atraumatic   Cranial Features: No abnormal craniofacial features  Pulmonary: Non-labored breathing, speaks in full sentences.   Cardiac: regular rate   Extremities: No clubbing, no edema  Neuromuscular: Cranial nerves grossly intact, no focal deficits      RESULTS/DATA     Bicarbonate (mmol/L)   Date Value   01/28/2024 27   12/04/2023 26   04/12/2021 28             ASSESSMENT/PLAN     Mr. Masters is a 63 y.o. male and He was referred to the  [facility] Sleep Medicine Clinic for evaluation of SALOME    Problem List, Orders, Assessment, Recommendations:  Problem List Items Addressed This Visit             ICD-10-CM    Hypertension - Primary I10     -134/81  -asymptomatic  -Continue pcp follow up         Sleep apnea G47.30     -update diagnosis due to >40lb weight gain he reports over past 10 years since last study  -prefers HSAT, ordered - [place]  -motor life warning on current device; unable to extract data- but reports nightly use with benefit (not snoring, no leg kicking)  -order placed for device with [facility], will be processed after sleep study (per Kiersten JONES); set for cpap 11 per last titration study; will follow DL and adjust as needed   -avoid drowsy driving   -considering OAT for part time use; will contact me if he would like a prescription / discussed mostly used for mild-moderate SALOME - he understands cpap is gold standard treatment         Relevant Orders    Home sleep apnea test (HSAT)    Positive Airway Pressure (PAP) Therapy    Obesity (BMI 35.0-39.9 without comorbidity) E66.9     -discussed relationship to SALOME  -encourage healthy diet/exercise/weight loss  -going to talk to PCP about medication options  -declines nutrition referral today          Other Visit Diagnoses         Codes    Class 2 obesity with body mass index (BMI) of 38.0 to 38.9 in adult, unspecified obesity type, unspecified whether serious comorbidity present     E66.9, Z68.38              Disposition    -mychart message with sleep study results   -will follow up after receiving new pap device

## 2024-03-25 NOTE — ASSESSMENT & PLAN NOTE
-update diagnosis due to >40lb weight gain he reports over past 10 years since last study  -prefers HSAT, ordered - [place]  -motor life warning on current device; unable to extract data- but reports nightly use with benefit (not snoring, no leg kicking)  -order placed for device with [facility], will be processed after sleep study (per Kiersten JONES); set for cpap 11 per last titration study; will follow DL and adjust as needed   -avoid drowsy driving   -considering OAT for part time use; will contact me if he would like a prescription / discussed mostly used for mild-moderate SALOME - he understands cpap is gold standard treatment

## 2024-04-01 DIAGNOSIS — G47.30 SLEEP APNEA, UNSPECIFIED TYPE: Primary | ICD-10-CM

## 2024-04-05 ENCOUNTER — PROCEDURE VISIT (OUTPATIENT)
Dept: UROLOGY | Facility: CLINIC | Age: 64
End: 2024-04-05
Payer: COMMERCIAL

## 2024-04-05 VITALS
WEIGHT: 246 LBS | HEART RATE: 77 BPM | HEIGHT: 67 IN | SYSTOLIC BLOOD PRESSURE: 136 MMHG | DIASTOLIC BLOOD PRESSURE: 88 MMHG | BODY MASS INDEX: 38.61 KG/M2 | TEMPERATURE: 97.2 F

## 2024-04-05 DIAGNOSIS — R31.0 GROSS HEMATURIA: Primary | ICD-10-CM

## 2024-04-05 DIAGNOSIS — K76.9 LESION OF LIVER: ICD-10-CM

## 2024-04-05 DIAGNOSIS — N40.0 BENIGN PROSTATIC HYPERPLASIA WITHOUT LOWER URINARY TRACT SYMPTOMS: ICD-10-CM

## 2024-04-05 LAB
POC APPEARANCE, URINE: CLEAR
POC BILIRUBIN, URINE: NEGATIVE
POC BLOOD, URINE: NEGATIVE
POC COLOR, URINE: YELLOW
POC GLUCOSE, URINE: NEGATIVE MG/DL
POC KETONES, URINE: NEGATIVE MG/DL
POC LEUKOCYTES, URINE: NEGATIVE
POC NITRITE,URINE: NEGATIVE
POC PH, URINE: 5.5 PH
POC PROTEIN, URINE: NEGATIVE MG/DL
POC SPECIFIC GRAVITY, URINE: 1.02
POC UROBILINOGEN, URINE: 0.2 EU/DL

## 2024-04-05 PROCEDURE — 52000 CYSTOURETHROSCOPY: CPT | Performed by: UROLOGY

## 2024-04-05 PROCEDURE — 81002 URINALYSIS NONAUTO W/O SCOPE: CPT | Performed by: UROLOGY

## 2024-04-05 PROCEDURE — 99214 OFFICE O/P EST MOD 30 MIN: CPT | Performed by: UROLOGY

## 2024-04-05 ASSESSMENT — PAIN SCALES - GENERAL: PAINLEVEL: 0-NO PAIN

## 2024-04-05 NOTE — PROGRESS NOTES
Scribed for Dr. Olive Butt by Noah Willams.  I, Dr. Olive Butt, have personally reviewed and agreed with the information entered by the Virtual Scribe. 04/05/24    History of Present Illness (HPI):  TODAY: (04/05/24)  Ramin Masters is a 63 y.o. male with history of epididymoorchitis (L), s/p course of levofloxacin, however following this had developed episodes of gross hematuria. Microscopic UA positive for >20 RBC's, 6-10 WBC's. (01/28/24). Urine culture negative (02/26/24).    Presents today for cystoscopy as part of his hematuria work-up.   Latest CTU results shared with patient;  Negative for upper tract source; bladder unremarkable.  His urinary symptoms have been stable, he feels he empties well.   His gross hematuria has since resolved, and has not recurred.     CTU (03/19/24) personally reviewed.   Noted mild prostatomegaly.  BL simple appearing renal cysts.   Largest on the left, ~7.3 cm.   Otherwise, no stones or mass BL.  No hydronephrosis bilaterally.   Bladder unremarkable.     Cystoscopy performed:   Ramin Masters identified using two (2) forms of identification.  Procedure: diagnostic cystourethroscopy  Indications for procedure: gross hematuria.   Risks, benefits, and alternatives were discussed in detail.   Patient appears to understand and agrees to proceed.   Patient has signed the procedure consent form.     Cystoscopy findings:  Urethra: normal course and caliber, no evidence of stricture or lesion.  Prostate: obstructing lateral hypertrophy with intravesical protrusion.   Bladder: normal capacity, trabeculated bladder, no masses.   Post-cystoscopy: Patient tolerated procedure without complications.    TO REVIEW: Last visit (03/01/24)  Presenting virtually today for a follow up visit.   Reports his testicular discomfort resolved s/p antibiotics.   However, acknowledges recent episodes of gross hematuria.   Associated with mild dysuria and passing a few clots.   Onset occurred  shortly after the Superbowl and developing a GI illness.   Denies any flank pain, fevers or chills.      TO REVIEW: Initial visit (01/30/24)  Presenting as a new patient due to left testicular pain. He was seen in the ER on 1/28/24 with complaints of left testicular swelling and pain associated with fever, fatigue and body aches. Scrotal US was obtained which showed asymmetrically enlarged and hypervascular left testicle and left epididymis most consistent with epididymo-orchitis. He was started on levofloxacin x 10 days. Today, he has significant discomfort, low grade fever and chills. Denies any recent gross hematuria, nausea or vomiting.      I reviewed scrotal US from 1/28/24 which showed asymmetrically enlarged and hypervascular left testicle and left epididymis most consistent with epididymo-orchitis.    No past medical history on file.  No past surgical history on file.  No family history on file.  Social History     Tobacco Use   Smoking Status Never    Passive exposure: Never   Smokeless Tobacco Never     Current Outpatient Medications   Medication Sig Dispense Refill    atorvastatin (Lipitor) 10 mg tablet Take 1 tablet (10 mg) by mouth once daily. 90 tablet 3    lisinopril 20 mg tablet Take 1 tablet (20 mg) by mouth once daily. 90 tablet 3     No current facility-administered medications for this visit.     No Known Allergies  Past medical, surgical, family and social history in the chart was reviewed and is accurate including any additions to what is in this HPI.    Review of systems (ROS):   Pertinent information as listed in the HPI.        Objective   There were no vitals taken for this visit.  Physical Exam:  Constitutional: NAD  HEENT: AT/NC  Resp: Non labored respirations.  Skin: No jaundice or visible skin lesions.  Neuro: No focal deficits.  Psych: Appropriate mood and affect.    Lab Review:  Lab Results   Component Value Date    WBC 17.8 (H) 01/28/2024    RBC 5.19 01/28/2024    HGB 15.8  01/28/2024    HCT 46.0 01/28/2024     01/28/2024      Lab Results   Component Value Date    BUN 15 01/28/2024    CREATININE 1.08 03/04/2024      Lab Results   Component Value Date    PSA 2.77 03/03/2023    PSA 3.5 12/07/2022     Lab Results   Component Value Date    CHOL 155 12/04/2023    TRIG 132 12/04/2023    HDL 44.2 12/04/2023    ALT 19 01/28/2024    AST 11 01/28/2024     01/28/2024    K 4.1 01/28/2024     01/28/2024    CO2 27 01/28/2024        ASSESSMENT:  Problem List Items Addressed This Visit    None     PLAN:  1. Gross hematuria  2. BPH   Previous MRI (May 2021) revealed a 67 gram  prostate; PSA density 1.2.   Underwent cystoscopy today, which noted an obstructing prostate.  Otherwise, negative for malignancy.     Suspect BPH likely source of his hematuria.   His hematuria has since resolved and not recurred.   Discussed starting finasteride for treatment, risks discussed.   He declined 2/2 potential sexual side-effects.   RTC in 4 months for reassessment, sooner if hematuria recurs.     3. Renal cysts, simple - bilateral (L>R), up to 7.3 cm  We discussed the natural history of simple renal cysts.  Reassured that these are very common and benign.   I would not recommend further workup or surveillance for this.  Patient reassured.     4. Epididymoorchitis - resolved s/p course of levofloxacin  Previous testicular discomfort since resolved.   Encouraged to continue supportive undergarments and NSAIDS PRN.    5. Hepatic mass  Notable hepatic findings (incidental per CTU):  Diffuse hepatic steatosis. 3.6 x 2.6 cm mildly enhancing posterior right hepatic lobe mass with central hypoattenuation, possibly an FNH or adenoma. Recommend further evaluation with MRI of the liver, per Radiologist.    Liver MRI ordered.   If concerning, will call and refer to a specialist for continued care.     All questions were answered to the patient’s satisfaction.  Patient agrees with the plan and wishes to  proceed.  Continue follow-up for ongoing care of his chronic medical conditions.    I spent 30 minutes of dedicated E&M time, including preparation and review of records, notes, and data, time spent with patient/family, and documentation.    Scribed for Dr. Olive Butt by Noah Willams.  I, Dr. Olive Butt, have personally reviewed and agreed with the information entered by the Virtual Scribe. 04/05/24

## 2024-04-17 ENCOUNTER — APPOINTMENT (OUTPATIENT)
Dept: SLEEP MEDICINE | Facility: CLINIC | Age: 64
End: 2024-04-17
Payer: COMMERCIAL

## 2024-05-03 ENCOUNTER — HOSPITAL ENCOUNTER (OUTPATIENT)
Dept: RADIOLOGY | Facility: CLINIC | Age: 64
Discharge: HOME | End: 2024-05-03
Payer: COMMERCIAL

## 2024-05-03 DIAGNOSIS — K76.9 LESION OF LIVER: ICD-10-CM

## 2024-05-03 PROCEDURE — 74183 MRI ABD W/O CNTR FLWD CNTR: CPT

## 2024-05-03 PROCEDURE — 74183 MRI ABD W/O CNTR FLWD CNTR: CPT | Performed by: STUDENT IN AN ORGANIZED HEALTH CARE EDUCATION/TRAINING PROGRAM

## 2024-05-03 PROCEDURE — 2550000001 HC RX 255 CONTRASTS: Performed by: UROLOGY

## 2024-05-03 PROCEDURE — A9575 INJ GADOTERATE MEGLUMI 0.1ML: HCPCS | Performed by: UROLOGY

## 2024-05-03 RX ORDER — GADOTERATE MEGLUMINE 376.9 MG/ML
20 INJECTION INTRAVENOUS
Status: COMPLETED | OUTPATIENT
Start: 2024-05-03 | End: 2024-05-03

## 2024-05-03 RX ADMIN — GADOTERATE MEGLUMINE 20 ML: 376.9 INJECTION INTRAVENOUS at 09:46

## 2024-08-05 ENCOUNTER — APPOINTMENT (OUTPATIENT)
Dept: UROLOGY | Facility: CLINIC | Age: 64
End: 2024-08-05
Payer: COMMERCIAL

## 2024-08-13 ENCOUNTER — APPOINTMENT (OUTPATIENT)
Dept: UROLOGY | Facility: CLINIC | Age: 64
End: 2024-08-13
Payer: COMMERCIAL

## 2024-08-20 ENCOUNTER — APPOINTMENT (OUTPATIENT)
Dept: UROLOGY | Facility: CLINIC | Age: 64
End: 2024-08-20
Payer: COMMERCIAL

## 2024-08-20 DIAGNOSIS — R31.0 GROSS HEMATURIA: ICD-10-CM

## 2024-08-20 PROCEDURE — 99213 OFFICE O/P EST LOW 20 MIN: CPT | Performed by: UROLOGY

## 2024-08-20 NOTE — PROGRESS NOTES
This visit was completed via telemedicine. All issues as below were discussed and addressed but no physical exam was performed unless allowed by visual confirmation. If it was felt that the patient should be evaluated in clinic, then they were directed there. Patient verbally consented to visit.      History of Present Illness (HPI):  TODAY: (08/20/24)  Ramin Masters is a 63 y.o. male with history of epididymoorchitis (L), s/p course of levofloxacin, however following this had developed episodes of gross hematuria, workup negative 6 months ago. Microscopic UA was positive for >20 RBC's, 6-10 WBC's. (01/28/24). Urine culture negative (02/26/24).    Patient presents today for a follow up visit. Doing well, no further hematuria episodes.    No past medical history on file.  No past surgical history on file.  No family history on file.  Social History     Tobacco Use   Smoking Status Never    Passive exposure: Never   Smokeless Tobacco Never     Current Outpatient Medications   Medication Sig Dispense Refill    atorvastatin (Lipitor) 10 mg tablet Take 1 tablet (10 mg) by mouth once daily. 90 tablet 3    lisinopril 20 mg tablet Take 1 tablet (20 mg) by mouth once daily. 90 tablet 0     No current facility-administered medications for this visit.     Allergies   Allergen Reactions    Gadolinium-Containing Contrast Media Other     Sneezing - will need contrast allergy prep in the future      Past medical, surgical, family and social history in the chart was reviewed and is accurate including any additions to what is in this HPI.    Review of systems (ROS):   Pertinent information as listed in the HPI.        Objective   There were no vitals taken for this visit.  Physical Exam:  Constitutional: NAD  HEENT: AT/NC  Resp: Non labored respirations.  Skin: No jaundice or visible skin lesions.  Neuro: No focal deficits.  Psych: Appropriate mood and affect.    Lab Review:  Lab Results   Component Value Date    WBC 17.8 (H)  01/28/2024    RBC 5.19 01/28/2024    HGB 15.8 01/28/2024    HCT 46.0 01/28/2024     01/28/2024      Lab Results   Component Value Date    BUN 15 01/28/2024    CREATININE 1.08 03/04/2024      Lab Results   Component Value Date    PSA 2.77 03/03/2023    PSA 3.5 12/07/2022     Lab Results   Component Value Date    CHOL 155 12/04/2023    TRIG 132 12/04/2023    HDL 44.2 12/04/2023    ALT 19 01/28/2024    AST 11 01/28/2024     01/28/2024    K 4.1 01/28/2024     01/28/2024    CO2 27 01/28/2024        ASSESSMENT:  Problem List Items Addressed This Visit          Genitourinary and Reproductive    Gross hematuria      PLAN:  1. Gross hematuria    Resolved. Think it was potentially due to enlarged prostate.     2. BPH   Previous MRI (May 2021) revealed a 67 gram  prostate; PSA density 1.2.   Underwent cystoscopy today, which noted an obstructing prostate.  Otherwise, negative for malignancy.     Suspect BPH likely source of his hematuria.   His hematuria has since resolved and not recurred.     3. Renal cysts, simple - bilateral (L>R), up to 7.3 cm  We discussed the natural history of simple renal cysts.  Reassured that these are very common and benign.   I would not recommend further workup or surveillance for this.  Patient reassured.     4. Epididymoorchitis - resolved s/p course of levofloxacin  Previous testicular discomfort since resolved.       Follow up annually    All questions were answered to the patient’s satisfaction.  Patient agrees with the plan and wishes to proceed.  Continue follow-up for ongoing care of his chronic medical conditions.    I spent 20 minutes of dedicated E&M time, including preparation and review of records, notes, and data, time spent with patient/family, and documentation.    Scribed for Dr. Olive Butt by Karyn Gonzalez.  I, Dr. Olive Butt, have personally reviewed and agreed with the information entered by the Virtual Scribe. 08/20/24

## 2024-11-14 ENCOUNTER — APPOINTMENT (OUTPATIENT)
Dept: PRIMARY CARE | Facility: CLINIC | Age: 64
End: 2024-11-14
Payer: COMMERCIAL

## 2024-11-18 DIAGNOSIS — G47.30 SLEEP APNEA, UNSPECIFIED TYPE: ICD-10-CM

## 2024-11-18 DIAGNOSIS — I10 PRIMARY HYPERTENSION: ICD-10-CM

## 2024-11-18 DIAGNOSIS — Z23 NEED FOR TETANUS BOOSTER: ICD-10-CM

## 2024-11-18 DIAGNOSIS — Z13.6 SCREENING FOR HEART DISEASE: ICD-10-CM

## 2024-11-18 DIAGNOSIS — Z00.00 HEALTH CARE MAINTENANCE: ICD-10-CM

## 2024-11-18 DIAGNOSIS — E78.00 HYPERCHOLESTEROLEMIA: ICD-10-CM

## 2024-11-18 RX ORDER — LISINOPRIL 20 MG/1
20 TABLET ORAL DAILY
Qty: 30 TABLET | Refills: 1 | Status: SHIPPED | OUTPATIENT
Start: 2024-11-18

## 2024-11-18 RX ORDER — ATORVASTATIN CALCIUM 10 MG/1
10 TABLET, FILM COATED ORAL DAILY
Qty: 30 TABLET | Refills: 1 | Status: SHIPPED | OUTPATIENT
Start: 2024-11-18

## 2024-12-19 ENCOUNTER — APPOINTMENT (OUTPATIENT)
Dept: PRIMARY CARE | Facility: CLINIC | Age: 64
End: 2024-12-19
Payer: COMMERCIAL

## 2024-12-19 VITALS
SYSTOLIC BLOOD PRESSURE: 106 MMHG | BODY MASS INDEX: 32.18 KG/M2 | OXYGEN SATURATION: 98 % | WEIGHT: 205 LBS | DIASTOLIC BLOOD PRESSURE: 74 MMHG | HEART RATE: 83 BPM | HEIGHT: 67 IN

## 2024-12-19 DIAGNOSIS — I10 HYPERTENSION, UNSPECIFIED TYPE: ICD-10-CM

## 2024-12-19 DIAGNOSIS — Z13.6 SCREENING FOR HEART DISEASE: ICD-10-CM

## 2024-12-19 DIAGNOSIS — Z23 NEED FOR TETANUS BOOSTER: ICD-10-CM

## 2024-12-19 DIAGNOSIS — Z23 NEED FOR PROPHYLACTIC VACCINATION AGAINST STREPTOCOCCUS PNEUMONIAE (PNEUMOCOCCUS): Primary | ICD-10-CM

## 2024-12-19 DIAGNOSIS — L30.9 CHRONIC ECZEMA: ICD-10-CM

## 2024-12-19 DIAGNOSIS — G47.30 SLEEP APNEA, UNSPECIFIED TYPE: ICD-10-CM

## 2024-12-19 DIAGNOSIS — E78.00 HYPERCHOLESTEROLEMIA: ICD-10-CM

## 2024-12-19 DIAGNOSIS — Z00.00 ROUTINE GENERAL MEDICAL EXAMINATION AT HEALTH CARE FACILITY: ICD-10-CM

## 2024-12-19 DIAGNOSIS — I10 PRIMARY HYPERTENSION: ICD-10-CM

## 2024-12-19 DIAGNOSIS — Z12.5 SCREENING PSA (PROSTATE SPECIFIC ANTIGEN): ICD-10-CM

## 2024-12-19 DIAGNOSIS — Z00.00 HEALTH CARE MAINTENANCE: ICD-10-CM

## 2024-12-19 DIAGNOSIS — R91.1 LUNG NODULE: ICD-10-CM

## 2024-12-19 PROCEDURE — 1170F FXNL STATUS ASSESSED: CPT | Performed by: INTERNAL MEDICINE

## 2024-12-19 PROCEDURE — 1159F MED LIST DOCD IN RCRD: CPT | Performed by: INTERNAL MEDICINE

## 2024-12-19 PROCEDURE — G0402 INITIAL PREVENTIVE EXAM: HCPCS | Performed by: INTERNAL MEDICINE

## 2024-12-19 PROCEDURE — 1036F TOBACCO NON-USER: CPT | Performed by: INTERNAL MEDICINE

## 2024-12-19 PROCEDURE — 3074F SYST BP LT 130 MM HG: CPT | Performed by: INTERNAL MEDICINE

## 2024-12-19 PROCEDURE — 90677 PCV20 VACCINE IM: CPT | Performed by: INTERNAL MEDICINE

## 2024-12-19 PROCEDURE — G0009 ADMIN PNEUMOCOCCAL VACCINE: HCPCS | Performed by: INTERNAL MEDICINE

## 2024-12-19 PROCEDURE — 3078F DIAST BP <80 MM HG: CPT | Performed by: INTERNAL MEDICINE

## 2024-12-19 PROCEDURE — 1123F ACP DISCUSS/DSCN MKR DOCD: CPT | Performed by: INTERNAL MEDICINE

## 2024-12-19 PROCEDURE — 3008F BODY MASS INDEX DOCD: CPT | Performed by: INTERNAL MEDICINE

## 2024-12-19 PROCEDURE — 1160F RVW MEDS BY RX/DR IN RCRD: CPT | Performed by: INTERNAL MEDICINE

## 2024-12-19 RX ORDER — CLOBETASOL PROPIONATE 0.5 MG/G
CREAM TOPICAL 2 TIMES DAILY
Qty: 30 G | Refills: 0 | Status: SHIPPED | OUTPATIENT
Start: 2024-12-19 | End: 2025-01-02

## 2024-12-19 RX ORDER — ATORVASTATIN CALCIUM 10 MG/1
10 TABLET, FILM COATED ORAL DAILY
Qty: 90 TABLET | Refills: 3 | Status: SHIPPED | OUTPATIENT
Start: 2024-12-19

## 2024-12-19 ASSESSMENT — ACTIVITIES OF DAILY LIVING (ADL)
BATHING: INDEPENDENT
DRESSING: INDEPENDENT
TAKING_MEDICATION: INDEPENDENT
GROCERY_SHOPPING: INDEPENDENT
DOING_HOUSEWORK: INDEPENDENT
MANAGING_FINANCES: INDEPENDENT

## 2024-12-19 ASSESSMENT — ENCOUNTER SYMPTOMS
POLYPHAGIA: 0
COUGH: 0
ACTIVITY CHANGE: 0
EYE REDNESS: 0
NAUSEA: 0
ARTHRALGIAS: 0
NERVOUS/ANXIOUS: 0
LIGHT-HEADEDNESS: 0
CHEST TIGHTNESS: 0
HEADACHES: 0
VOMITING: 0
DYSPHORIC MOOD: 0
BLOOD IN STOOL: 0
ABDOMINAL DISTENTION: 0
EYE DISCHARGE: 0
SHORTNESS OF BREATH: 0
BACK PAIN: 0
FACIAL SWELLING: 0
FEVER: 0
JOINT SWELLING: 0
AGITATION: 0
CONFUSION: 0
NUMBNESS: 0
DIAPHORESIS: 0
CHOKING: 0
DIFFICULTY URINATING: 0
ABDOMINAL PAIN: 0
POLYDIPSIA: 0
FATIGUE: 0
SPEECH DIFFICULTY: 0
EYE ITCHING: 0
FACIAL ASYMMETRY: 0
APPETITE CHANGE: 0
EYE PAIN: 0
HYPERACTIVE: 0

## 2024-12-19 ASSESSMENT — PATIENT HEALTH QUESTIONNAIRE - PHQ9
1. LITTLE INTEREST OR PLEASURE IN DOING THINGS: NOT AT ALL
2. FEELING DOWN, DEPRESSED OR HOPELESS: NOT AT ALL
SUM OF ALL RESPONSES TO PHQ9 QUESTIONS 1 AND 2: 0

## 2024-12-19 NOTE — ASSESSMENT & PLAN NOTE
Overtreated.  Stop lisinopril especially given dry cough.  Monitor blood pressure at home.  If it consistently is less than 130 systolic can remain off of the blood pressure medication if its elevated above 130 systolic consistently please call our office and we may need to adjust the medications

## 2024-12-19 NOTE — PROGRESS NOTES
Subjective   Reason for Visit: Ramin Masters is an 64 y.o. male here for a Medicare Wellness visit.          Reviewed all medications by prescribing practitioner or clinical pharmacist (such as prescriptions, OTCs, herbal therapies and supplements) and documented in the medical record.    HPI       Patient is a 64-year-old male with past medical history of hypertension and obesity who presents for wellness.  Overall patient is doing well.  He is lost a substantial on amount of weight on the compounded semaglutide.  Tolerating well.  Due for lab work.  Eating healthfully.  Up-to-date on vaccinations although the vaccinations have not transferred from his pharmacy to the system here.  Due for pneumonia    Up-to-date on colonoscopy.    No concerns today.                 Patient Care Team:  Rosalio Aranda DO as PCP - General (Internal Medicine)     Review of Systems   Constitutional:  Negative for activity change, appetite change, diaphoresis, fatigue and fever.   HENT:  Negative for dental problem, drooling, ear pain, facial swelling, hearing loss and nosebleeds.    Eyes:  Negative for pain, discharge, redness and itching.   Respiratory:  Negative for cough, choking, chest tightness and shortness of breath.    Gastrointestinal:  Negative for abdominal distention, abdominal pain, blood in stool, nausea and vomiting.   Endocrine: Negative for cold intolerance, heat intolerance, polydipsia and polyphagia.   Genitourinary:  Negative for difficulty urinating.   Musculoskeletal:  Negative for arthralgias, back pain and joint swelling.   Allergic/Immunologic: Negative for environmental allergies and food allergies.   Neurological:  Negative for facial asymmetry, speech difficulty, light-headedness, numbness and headaches.   Psychiatric/Behavioral:  Negative for agitation, confusion and dysphoric mood. The patient is not nervous/anxious and is not hyperactive.        Objective   Vitals:  /74   Pulse 83   Ht 1.7 m  "(5' 6.93\")   Wt 93 kg (205 lb)   SpO2 98%   BMI 32.17 kg/m²       Physical Exam  Constitutional:       Appearance: Normal appearance.   HENT:      Head: Normocephalic and atraumatic.      Nose: No congestion or rhinorrhea.      Mouth/Throat:      Mouth: Mucous membranes are moist.   Eyes:      Pupils: Pupils are equal, round, and reactive to light.   Cardiovascular:      Rate and Rhythm: Normal rate and regular rhythm.      Pulses: Normal pulses.      Heart sounds: Normal heart sounds. No murmur heard.     No friction rub. No gallop.   Pulmonary:      Effort: Pulmonary effort is normal. No respiratory distress.      Breath sounds: Normal breath sounds. No stridor. No wheezing or rales.   Abdominal:      General: Abdomen is flat. Bowel sounds are normal. There is no distension.      Palpations: Abdomen is soft. There is no mass.      Tenderness: There is no guarding.      Hernia: No hernia is present.   Musculoskeletal:         General: No swelling. Normal range of motion.      Cervical back: No rigidity.      Right lower leg: No edema.      Left lower leg: No edema.   Skin:     General: Skin is warm and dry.   Neurological:      General: No focal deficit present.      Mental Status: He is alert and oriented to person, place, and time. Mental status is at baseline.      Motor: No weakness.      Gait: Gait normal.   Psychiatric:         Mood and Affect: Mood normal.         Behavior: Behavior normal.         Thought Content: Thought content normal.         Assessment/Plan   Problem List Items Addressed This Visit       Hypercholesterolemia    Relevant Medications    atorvastatin (Lipitor) 10 mg tablet    Hypertension    Current Assessment & Plan     Overtreated.  Stop lisinopril especially given dry cough.  Monitor blood pressure at home.  If it consistently is less than 130 systolic can remain off of the blood pressure medication if its elevated above 130 systolic consistently please call our office and we may " need to adjust the medications         Relevant Medications    atorvastatin (Lipitor) 10 mg tablet    Other Relevant Orders    EKG 12-LEAD    Lipid panel    CBC and Auto Differential    Sleep apnea    Relevant Medications    atorvastatin (Lipitor) 10 mg tablet    Health care maintenance    Relevant Medications    atorvastatin (Lipitor) 10 mg tablet    Other Relevant Orders    CBC and Auto Differential    Comprehensive metabolic panel    TSH with reflex to Free T4 if abnormal    Albumin-Creatinine Ratio, Urine Random     Other Visit Diagnoses       Need for prophylactic vaccination against Streptococcus pneumoniae (pneumococcus)    -  Primary    Relevant Orders    Pneumococcal conjugate vaccine, 20-valent (PREVNAR 20) (Completed)    Screening PSA (prostate specific antigen)        Relevant Orders    PSA    Routine general medical examination at health care facility        Relevant Orders    Lipid panel    Need for tetanus booster        Relevant Medications    atorvastatin (Lipitor) 10 mg tablet    Screening for heart disease        Relevant Medications    atorvastatin (Lipitor) 10 mg tablet    Lung nodule        Relevant Orders    CT chest wo IV contrast    Chronic eczema        Relevant Medications    clobetasol (Temovate) 0.05 % cream

## 2024-12-24 ENCOUNTER — LAB (OUTPATIENT)
Dept: LAB | Facility: LAB | Age: 64
End: 2024-12-24
Payer: COMMERCIAL

## 2024-12-24 DIAGNOSIS — Z12.5 SCREENING PSA (PROSTATE SPECIFIC ANTIGEN): ICD-10-CM

## 2024-12-24 DIAGNOSIS — Z00.00 HEALTH CARE MAINTENANCE: ICD-10-CM

## 2024-12-24 DIAGNOSIS — I10 PRIMARY HYPERTENSION: ICD-10-CM

## 2024-12-24 DIAGNOSIS — I10 HYPERTENSION, UNSPECIFIED TYPE: ICD-10-CM

## 2024-12-24 DIAGNOSIS — Z00.00 ROUTINE GENERAL MEDICAL EXAMINATION AT HEALTH CARE FACILITY: ICD-10-CM

## 2024-12-24 LAB
ALBUMIN SERPL BCP-MCNC: 4.4 G/DL (ref 3.4–5)
ALP SERPL-CCNC: 52 U/L (ref 33–136)
ALT SERPL W P-5'-P-CCNC: 14 U/L (ref 10–52)
ANION GAP SERPL CALC-SCNC: 11 MMOL/L (ref 10–20)
AST SERPL W P-5'-P-CCNC: 14 U/L (ref 9–39)
BASOPHILS # BLD AUTO: 0.03 X10*3/UL (ref 0–0.1)
BASOPHILS NFR BLD AUTO: 0.4 %
BILIRUB SERPL-MCNC: 0.8 MG/DL (ref 0–1.2)
BUN SERPL-MCNC: 25 MG/DL (ref 6–23)
CALCIUM SERPL-MCNC: 9.7 MG/DL (ref 8.6–10.6)
CHLORIDE SERPL-SCNC: 110 MMOL/L (ref 98–107)
CHOLEST SERPL-MCNC: 146 MG/DL (ref 0–199)
CHOLESTEROL/HDL RATIO: 3.8
CO2 SERPL-SCNC: 28 MMOL/L (ref 21–32)
CREAT SERPL-MCNC: 1.15 MG/DL (ref 0.5–1.3)
CREAT UR-MCNC: 215.7 MG/DL (ref 20–370)
EGFRCR SERPLBLD CKD-EPI 2021: 71 ML/MIN/1.73M*2
EOSINOPHIL # BLD AUTO: 0.26 X10*3/UL (ref 0–0.7)
EOSINOPHIL NFR BLD AUTO: 3.6 %
ERYTHROCYTE [DISTWIDTH] IN BLOOD BY AUTOMATED COUNT: 13.4 % (ref 11.5–14.5)
GLUCOSE SERPL-MCNC: 88 MG/DL (ref 74–99)
HCT VFR BLD AUTO: 45.3 % (ref 41–52)
HDLC SERPL-MCNC: 38.7 MG/DL
HGB BLD-MCNC: 14.5 G/DL (ref 13.5–17.5)
IMM GRANULOCYTES # BLD AUTO: 0.02 X10*3/UL (ref 0–0.7)
IMM GRANULOCYTES NFR BLD AUTO: 0.3 % (ref 0–0.9)
LDLC SERPL CALC-MCNC: 90 MG/DL
LYMPHOCYTES # BLD AUTO: 2.25 X10*3/UL (ref 1.2–4.8)
LYMPHOCYTES NFR BLD AUTO: 31.6 %
MCH RBC QN AUTO: 29.7 PG (ref 26–34)
MCHC RBC AUTO-ENTMCNC: 32 G/DL (ref 32–36)
MCV RBC AUTO: 93 FL (ref 80–100)
MICROALBUMIN UR-MCNC: <7 MG/L
MICROALBUMIN/CREAT UR: NORMAL MG/G{CREAT}
MONOCYTES # BLD AUTO: 0.58 X10*3/UL (ref 0.1–1)
MONOCYTES NFR BLD AUTO: 8.1 %
NEUTROPHILS # BLD AUTO: 3.99 X10*3/UL (ref 1.2–7.7)
NEUTROPHILS NFR BLD AUTO: 56 %
NON HDL CHOLESTEROL: 107 MG/DL (ref 0–149)
NRBC BLD-RTO: 0 /100 WBCS (ref 0–0)
PLATELET # BLD AUTO: 284 X10*3/UL (ref 150–450)
POTASSIUM SERPL-SCNC: 5.1 MMOL/L (ref 3.5–5.3)
PROT SERPL-MCNC: 6.8 G/DL (ref 6.4–8.2)
PSA SERPL-MCNC: 2.27 NG/ML
RBC # BLD AUTO: 4.89 X10*6/UL (ref 4.5–5.9)
SODIUM SERPL-SCNC: 144 MMOL/L (ref 136–145)
TRIGL SERPL-MCNC: 85 MG/DL (ref 0–149)
TSH SERPL-ACNC: 0.98 MIU/L (ref 0.44–3.98)
VLDL: 17 MG/DL (ref 0–40)
WBC # BLD AUTO: 7.1 X10*3/UL (ref 4.4–11.3)

## 2024-12-24 PROCEDURE — 84443 ASSAY THYROID STIM HORMONE: CPT

## 2024-12-24 PROCEDURE — 82043 UR ALBUMIN QUANTITATIVE: CPT

## 2024-12-24 PROCEDURE — 80061 LIPID PANEL: CPT

## 2024-12-24 PROCEDURE — 84153 ASSAY OF PSA TOTAL: CPT

## 2024-12-24 PROCEDURE — 82570 ASSAY OF URINE CREATININE: CPT

## 2024-12-24 PROCEDURE — 80053 COMPREHEN METABOLIC PANEL: CPT

## 2024-12-24 PROCEDURE — 85025 COMPLETE CBC W/AUTO DIFF WBC: CPT

## 2025-01-09 ENCOUNTER — HOSPITAL ENCOUNTER (OUTPATIENT)
Dept: RADIOLOGY | Facility: CLINIC | Age: 65
Discharge: HOME | End: 2025-01-09
Payer: MEDICARE

## 2025-01-09 DIAGNOSIS — R91.1 LUNG NODULE: ICD-10-CM

## 2025-01-09 PROCEDURE — 71250 CT THORAX DX C-: CPT | Performed by: RADIOLOGY

## 2025-01-09 PROCEDURE — 71250 CT THORAX DX C-: CPT

## 2025-08-26 ENCOUNTER — APPOINTMENT (OUTPATIENT)
Dept: UROLOGY | Facility: CLINIC | Age: 65
End: 2025-08-26
Payer: COMMERCIAL